# Patient Record
Sex: MALE | Race: OTHER | HISPANIC OR LATINO | URBAN - METROPOLITAN AREA
[De-identification: names, ages, dates, MRNs, and addresses within clinical notes are randomized per-mention and may not be internally consistent; named-entity substitution may affect disease eponyms.]

---

## 2020-04-02 ENCOUNTER — EMERGENCY (EMERGENCY)
Facility: HOSPITAL | Age: 60
LOS: 1 days | Discharge: HOME | End: 2020-04-02
Payer: MEDICAID

## 2020-04-02 VITALS
SYSTOLIC BLOOD PRESSURE: 156 MMHG | OXYGEN SATURATION: 100 % | WEIGHT: 154.32 LBS | TEMPERATURE: 99 F | DIASTOLIC BLOOD PRESSURE: 85 MMHG | RESPIRATION RATE: 20 BRPM | HEART RATE: 96 BPM | HEIGHT: 66.54 IN

## 2020-04-02 PROBLEM — Z00.00 ENCOUNTER FOR PREVENTIVE HEALTH EXAMINATION: Status: ACTIVE | Noted: 2020-04-02

## 2020-04-02 PROCEDURE — 99282 EMERGENCY DEPT VISIT SF MDM: CPT

## 2020-04-02 NOTE — ED PROVIDER NOTE - NSFOLLOWUPINSTRUCTIONS_ED_ALL_ED_FT
Hoy puede o no swetha sido examinado para detectar el virus COVID-19. Tendrá que aislarse kody los próximos 12 días y luego puede salir del aislamiento siempre y cuando tenga 3 días sin fiebre (sin goldie ningún medicamento para la fiebre).    Para el dolor o la fiebre, puede goldie: Tylenol 1000 mg por vía oral cada 6 horas, según sea necesario. (Jose Cruz 4000 mg en 24 horas).    Mantente tremayne hidratado bebiendo mucha agua todos los días.    ** Regrese a la janice de urgencias si comienza a tener dificultad para respirar, si silvia síntomas empeoran o si le preocupa. De lo contrario, quédese en casa y aísle.    ** Si tiene amigos o familiares que tienen síntomas leves que pueden deberse al virus, dígales que se queden en casa    Quédese en casa: las personas que están levemente enfermas con COVID-19 pueden recuperarse en casa. No se vaya, excepto para obtener atención médica. No visitar áreas públicas.  Manténgase en contacto con hendrickson médico. Llame antes de recibir atención médica. Asegúrese de recibir atención si se siente peor o si michelle que es karina emergencia.  Evite el transporte público: evite usar el transporte público, el transporte compartido o los taxis.  Manténgase alejado de los demás: tanto hung sea posible, debe permanecer en karina "habitación para enfermos" específica y lejos de otras personas en hendrickson hogar. Use un baño separado, si está disponible.  Limite el contacto con mascotas y animales: debe restringir el contacto con mascotas y otros animales, edda hung lo haría con otras personas.  Aunque no strange habido informes de mascotas u otros animales que se enfermen con COVID-19, aún se recomienda que las personas con el virus limiten el contacto con los animales hasta que se conozca más información.  Si está enfermo: debe usar karina mascarilla cuando esté cerca de otras personas y antes de ingresar al consultorio de un proveedor de atención médica.  Si está cuidando a otros: si la persona enferma no puede usar karina máscara facial (por ejemplo, porque causa problemas para respirar), las personas que viven en el hogar deben permanecer en karina habitación diferente. Cuando los cuidadores ingresan a la habitación de la persona enferma, deben usar karina máscara facial. No se recomiendan visitantes, aparte de los cuidadores.  Cubierta: Cubra hendrickson boca y nariz con un pañuelo cuando tosa o estornude.  Eliminar: tirar los pañuelos usados ??en un bote de basura forrado.  Lávese las otilia: Lávese las otilia inmediatamente con agua y jabón kody al menos 20 segundos. Si no hay agua y jabón disponibles, lávese las otilia con un desinfectante para otilia a base de alcohol que contenga al menos 60% de alcohol.  Desinfectante para otilia: si no hay agua y jabón disponibles, use un desinfectante para otilia a base de alcohol con al menos 60% de alcohol, cubriendo todas las superficies de silvia oitlia y frotándolas hasta que se sientan secas.  Jabón y agua: el jabón y el agua son la mejor opción, especialmente si las otilia están visiblemente sucias.  Evite tocar: evite tocarse los ojos, la nariz y la boca con las otilia sin jaylene.  No comparta: No comparta platos, vasos, vasos, utensilios para comer, toallas o ropa de cama con otras personas en hendrickson hogar.  Lávese tremayne después del uso: después de usar estos artículos, lávelos tremayne con agua y jabón o póngalos en el lavavajillas.  Limpie y desinfecte: Rutinariamente limpie las superficies de alto contacto en hendrickson "habitación para enfermos" y baño. Deje que otra persona limpie y desinfecte las superficies en las áreas comunes, lucia no en hendrickson habitación y baño.  Si un cuidador u otra persona necesita limpiar y desinfectar la habitación o el baño de karina persona enferma, debe hacerlo según sea necesario. El cuidador / otra persona debe usar karina máscara y esperar el mayor tiempo posible después de que la persona enferma haya usado el baño.  Las superficies de alto contacto incluyen teléfonos, controles remotos, mostradores, mesas, pomos de marisel, accesorios de baño, inodoros, teclados, tabletas y mesitas de noche.  Limpie y desinfecte las áreas que pueden tener yamilet, heces o fluidos corporales.  Busque atención médica, lucia llame leo: busque atención médica de inmediato si hendrickson enfermedad está empeorando (por ejemplo, si tiene dificultad para respirar).  Llame a hendrickson médico antes de ingresar: Antes de ir al consultorio del médico o la janice de emergencias, llame con anticipación y dígales silvia síntomas. Te dirán qué hacer.  Use karina máscara facial: si es posible, póngase karina máscara facial antes de ingresar al edificio. Si no puede ponerse karina máscara facial, trate de mantener karina distancia savage de otras personas (al menos a 6 pies de distancia). [place] ayudará a proteger a las personas en la oficina o en la janice de espera.  Siga las instrucciones de cuidado de hendrickson proveedor de atención médica y el departamento de linn local: las autoridades de linn locales le darán instrucciones sobre cómo controlar silvia síntomas y reportar información.  Llame al 911 si tiene karina emergencia médica: si tiene karina emergencia médica y necesita llamar al 911, notifique al operador que tiene o michelle que podría tener COVID-19. Si es posible, póngase karina mascarilla antes de que llegue la ayuda médica.

## 2020-04-02 NOTE — ED PROVIDER NOTE - CLINICAL SUMMARY MEDICAL DECISION MAKING FREE TEXT BOX
Well-appearing, vitals stable. O2 sat WNL. No signs of distress. No HAILE. Will discharge with isolation precautions and strict return instructions. Questions answered.

## 2020-04-02 NOTE — ED PROVIDER NOTE - OBJECTIVE STATEMENT
59 year-old male, no significant PMHx, presents with cc flu-like symptoms for 2-3 days. C/o subjective fever, chills, weakness, and cough. Reports significant improvement when takes Tylenol. 2 other people in household tested positive for COVID-19. Denies any chest pain, SOB, abdominal pain, urinary symptoms, rash, photophobia, neck stiffness or any other complaints.

## 2020-04-02 NOTE — ED PROVIDER NOTE - PATIENT PORTAL LINK FT
You can access the FollowMyHealth Patient Portal offered by [facility] by registering at the following website: [email or web address]. By joining [employer] FollowMyHealth portal, you will also be able to view your health information using other applications (apps) compatible with our system.

## 2020-04-02 NOTE — ED ADULT NURSE NOTE - CAS ELECT INFOMATION PROVIDED
seen, treated, and released by MANFRED Torrez in intake. No nursing intervention required./DC instructions

## 2020-04-03 ENCOUNTER — APPOINTMENT (OUTPATIENT)
Dept: DISASTER EMERGENCY | Facility: CLINIC | Age: 60
End: 2020-04-03

## 2022-01-11 NOTE — ED ADULT TRIAGE NOTE - PATIENT ON (OXYGEN DELIVERY METHOD)
[address] [facility], [address]  [place] [address]  Phone: [phone]          Return to Work/School    Patient: Freeman Montes  YOB: 1983   Date: 01/11/2022     To Whom It May Concern:     Freeman Montes was in contact with/seen in my office on 01/11/2022. COVID-19 is present in our communities across the state. There is limited testing for COVID at this time, so not all patients can be tested. In this situation, your employee meets the following criteria:     Freeman Montes has met the criteria for COVID-19 testing and has a POSITIVE result. He can return to work once they are asymptomatic for 24 hours without the use of fever reducing medications AND at least five days from the start of symptoms (or from the first positive result if they have no symptoms).      If you have any questions or concerns, or if I can be of further assistance, please do not hesitate to contact me.     Sincerely,    Dianna Parada PA-C        room air

## 2022-06-27 ENCOUNTER — EMERGENCY (EMERGENCY)
Facility: HOSPITAL | Age: 62
LOS: 0 days | Discharge: HOME | End: 2022-06-27
Attending: EMERGENCY MEDICINE

## 2022-06-27 VITALS
WEIGHT: 160.06 LBS | TEMPERATURE: 98 F | HEIGHT: 66.54 IN | SYSTOLIC BLOOD PRESSURE: 158 MMHG | HEART RATE: 85 BPM | DIASTOLIC BLOOD PRESSURE: 96 MMHG | RESPIRATION RATE: 18 BRPM | OXYGEN SATURATION: 97 %

## 2022-06-27 VITALS
DIASTOLIC BLOOD PRESSURE: 84 MMHG | TEMPERATURE: 98 F | SYSTOLIC BLOOD PRESSURE: 147 MMHG | OXYGEN SATURATION: 98 % | RESPIRATION RATE: 18 BRPM | HEART RATE: 80 BPM

## 2022-06-27 DIAGNOSIS — K29.70 GASTRITIS, UNSPECIFIED, WITHOUT BLEEDING: ICD-10-CM

## 2022-06-27 DIAGNOSIS — R10.13 EPIGASTRIC PAIN: ICD-10-CM

## 2022-06-27 DIAGNOSIS — I10 ESSENTIAL (PRIMARY) HYPERTENSION: ICD-10-CM

## 2022-06-27 DIAGNOSIS — M19.90 UNSPECIFIED OSTEOARTHRITIS, UNSPECIFIED SITE: ICD-10-CM

## 2022-06-27 LAB
ALBUMIN SERPL ELPH-MCNC: 3.8 G/DL — SIGNIFICANT CHANGE UP (ref 3.3–5)
ALP SERPL-CCNC: 73 U/L — SIGNIFICANT CHANGE UP (ref 40–120)
ALT FLD-CCNC: 28 U/L — SIGNIFICANT CHANGE UP (ref 12–78)
ANION GAP SERPL CALC-SCNC: 2 MMOL/L — LOW (ref 5–17)
AST SERPL-CCNC: 13 U/L — LOW (ref 15–37)
BASOPHILS # BLD AUTO: 0.03 K/UL — SIGNIFICANT CHANGE UP (ref 0–0.2)
BASOPHILS NFR BLD AUTO: 0.4 % — SIGNIFICANT CHANGE UP (ref 0–2)
BILIRUB SERPL-MCNC: 0.4 MG/DL — SIGNIFICANT CHANGE UP (ref 0.2–1.2)
BUN SERPL-MCNC: 24 MG/DL — HIGH (ref 7–23)
CALCIUM SERPL-MCNC: 9.2 MG/DL — SIGNIFICANT CHANGE UP (ref 8.5–10.1)
CHLORIDE SERPL-SCNC: 108 MMOL/L — SIGNIFICANT CHANGE UP (ref 96–108)
CO2 SERPL-SCNC: 30 MMOL/L — SIGNIFICANT CHANGE UP (ref 22–31)
CREAT SERPL-MCNC: 0.91 MG/DL — SIGNIFICANT CHANGE UP (ref 0.5–1.3)
EGFR: 97 ML/MIN/1.73M2 — SIGNIFICANT CHANGE UP
EOSINOPHIL # BLD AUTO: 0.04 K/UL — SIGNIFICANT CHANGE UP (ref 0–0.5)
EOSINOPHIL NFR BLD AUTO: 0.6 % — SIGNIFICANT CHANGE UP (ref 0–6)
GLUCOSE SERPL-MCNC: 94 MG/DL — SIGNIFICANT CHANGE UP (ref 70–99)
HCT VFR BLD CALC: 49.1 % — SIGNIFICANT CHANGE UP (ref 39–50)
HGB BLD-MCNC: 16 G/DL — SIGNIFICANT CHANGE UP (ref 13–17)
IMM GRANULOCYTES NFR BLD AUTO: 0.4 % — SIGNIFICANT CHANGE UP (ref 0–1.5)
LIDOCAIN IGE QN: 78 U/L — SIGNIFICANT CHANGE UP (ref 73–393)
LYMPHOCYTES # BLD AUTO: 1.4 K/UL — SIGNIFICANT CHANGE UP (ref 1–3.3)
LYMPHOCYTES # BLD AUTO: 20.1 % — SIGNIFICANT CHANGE UP (ref 13–44)
MCHC RBC-ENTMCNC: 29.1 PG — SIGNIFICANT CHANGE UP (ref 27–34)
MCHC RBC-ENTMCNC: 32.6 G/DL — SIGNIFICANT CHANGE UP (ref 32–36)
MCV RBC AUTO: 89.3 FL — SIGNIFICANT CHANGE UP (ref 80–100)
MONOCYTES # BLD AUTO: 0.69 K/UL — SIGNIFICANT CHANGE UP (ref 0–0.9)
MONOCYTES NFR BLD AUTO: 9.9 % — SIGNIFICANT CHANGE UP (ref 2–14)
NEUTROPHILS # BLD AUTO: 4.78 K/UL — SIGNIFICANT CHANGE UP (ref 1.8–7.4)
NEUTROPHILS NFR BLD AUTO: 68.6 % — SIGNIFICANT CHANGE UP (ref 43–77)
NRBC # BLD: 0 /100 WBCS — SIGNIFICANT CHANGE UP (ref 0–0)
PLATELET # BLD AUTO: 223 K/UL — SIGNIFICANT CHANGE UP (ref 150–400)
POTASSIUM SERPL-MCNC: 4.5 MMOL/L — SIGNIFICANT CHANGE UP (ref 3.5–5.3)
POTASSIUM SERPL-SCNC: 4.5 MMOL/L — SIGNIFICANT CHANGE UP (ref 3.5–5.3)
PROT SERPL-MCNC: 7.7 GM/DL — SIGNIFICANT CHANGE UP (ref 6–8.3)
RBC # BLD: 5.5 M/UL — SIGNIFICANT CHANGE UP (ref 4.2–5.8)
RBC # FLD: 13.9 % — SIGNIFICANT CHANGE UP (ref 10.3–14.5)
SODIUM SERPL-SCNC: 140 MMOL/L — SIGNIFICANT CHANGE UP (ref 135–145)
TROPONIN I, HIGH SENSITIVITY RESULT: <3 NG/L — SIGNIFICANT CHANGE UP
WBC # BLD: 6.97 K/UL — SIGNIFICANT CHANGE UP (ref 3.8–10.5)
WBC # FLD AUTO: 6.97 K/UL — SIGNIFICANT CHANGE UP (ref 3.8–10.5)

## 2022-06-27 PROCEDURE — 93010 ELECTROCARDIOGRAM REPORT: CPT

## 2022-06-27 PROCEDURE — 99285 EMERGENCY DEPT VISIT HI MDM: CPT

## 2022-06-27 RX ORDER — SODIUM CHLORIDE 9 MG/ML
1000 INJECTION INTRAMUSCULAR; INTRAVENOUS; SUBCUTANEOUS ONCE
Refills: 0 | Status: COMPLETED | OUTPATIENT
Start: 2022-06-27 | End: 2022-06-27

## 2022-06-27 RX ORDER — ONDANSETRON 8 MG/1
1 TABLET, FILM COATED ORAL
Qty: 6 | Refills: 0
Start: 2022-06-27 | End: 2022-06-29

## 2022-06-27 RX ORDER — FAMOTIDINE 10 MG/ML
20 INJECTION INTRAVENOUS ONCE
Refills: 0 | Status: COMPLETED | OUTPATIENT
Start: 2022-06-27 | End: 2022-06-27

## 2022-06-27 RX ORDER — LIDOCAINE 4 G/100G
10 CREAM TOPICAL ONCE
Refills: 0 | Status: COMPLETED | OUTPATIENT
Start: 2022-06-27 | End: 2022-06-27

## 2022-06-27 RX ORDER — FAMOTIDINE 10 MG/ML
1 INJECTION INTRAVENOUS
Qty: 14 | Refills: 0
Start: 2022-06-27 | End: 2022-07-03

## 2022-06-27 RX ADMIN — SODIUM CHLORIDE 1000 MILLILITER(S): 9 INJECTION INTRAMUSCULAR; INTRAVENOUS; SUBCUTANEOUS at 22:48

## 2022-06-27 RX ADMIN — SODIUM CHLORIDE 1000 MILLILITER(S): 9 INJECTION INTRAMUSCULAR; INTRAVENOUS; SUBCUTANEOUS at 20:28

## 2022-06-27 RX ADMIN — LIDOCAINE 10 MILLILITER(S): 4 CREAM TOPICAL at 20:28

## 2022-06-27 RX ADMIN — FAMOTIDINE 20 MILLIGRAM(S): 10 INJECTION INTRAVENOUS at 20:28

## 2022-06-27 RX ADMIN — Medication 15 MILLILITER(S): at 20:45

## 2022-06-27 NOTE — ED PROVIDER NOTE - OBJECTIVE STATEMENT
61 year old male history of arthritis, htn presents for abd pain.  Patient states that he has been having intermittent abd pain epigastric radiating to periumbilical region for the last 2 weeks.  Patient states he took pepto-bismol and sodium bicarbonate today for pain at 1500 but it did not provide relief.  States that he got checked out by EMS who suggested he come to the hospital for further evaluation. Patient denies any headache, dizziness, lightheadedness, fainting, chest pain, palpitations, shortness of breath, vomiting, diarrhea, constipation, fevers, chills, urinary symptoms.

## 2022-06-27 NOTE — ED PROVIDER NOTE - PHYSICAL EXAMINATION
GEN:   comfortable, in no apparent distress, AOx3  EYES:   PERRL, extra-occular movements intact  HEENT:   airway patent, moist mucosal membranes, uvula midline  CV:  RRR, Pulses- Radial: 2+ bilateral and equal  RESP:   clear to auscultation bilaterally, non-labored, speaking in full sentences  ABD:   soft, no guarding.  epigastric and periumbilical TTP  :   no cva tenderness  MSK:   no musculoskeletal tenderness, 5/5 strength, moving all extremities  SKIN:   dry, intact, no rash  NEURO:   AOx3, no focal weakness or loss of sensation, gait normal, GCS 15  PSYCH: calm, cooperative, no apparent risk to self and others

## 2022-06-27 NOTE — ED ADULT TRIAGE NOTE - DATE OF LAST VACCINATION
Patient called and states she has developed a yeast infection from antibiotics. She would like diflucan called in. She uses [facility].     27-Jun-2022

## 2022-06-27 NOTE — ED PROVIDER NOTE - NS ED ATTENDING STATEMENT MOD
This was a shared visit with the TERESA. I reviewed and verified the documentation and independently performed the documented:

## 2022-06-27 NOTE — ED PROVIDER NOTE - CARE PROVIDER_API CALL
Diony Adhikari)  Internal Medicine  [address] [facility], [address] [place] [address]  Phone: [phone]  Fax: [phone]  Follow Up Time: 1-3 Days

## 2022-06-27 NOTE — ED PROVIDER NOTE - PATIENT PORTAL LINK FT
You can access the FollowMyHealth Patient Portal offered by [facility] by registering at the following website: [email or web address]. By joining [employer]’s FollowMyHealth portal, you will also be able to view your health information using other applications (apps) compatible with our system.

## 2022-06-27 NOTE — ED ADULT NURSE NOTE - OBJECTIVE STATEMENT
received pt in bed 34D,  in use (Sandra, [id number]), 60yo M Aox4, med hx of arthritis, HTN on rx: meloxicam, nkda, presents to ED as per ems, pt c/o abdominal pain x 1 week. pt took Pepto-Bismol with no relief. Pt denies any chest pain, sob, n/v/d, recent travels, exposure to sick contacts.

## 2022-06-27 NOTE — ED PROVIDER NOTE - NSFOLLOWUPINSTRUCTIONS_ED_ALL_ED_FT
Gastritis - Adultos    Gastritis, Adult       La gastritis es la inflamación del estómago. Hay dos tipos de gastritis:  •Gastritis aguda. Heidy tipo aparece de manera repentina.      •Gastritis crónica. Heidy tipo es mucho más frecuente y dura mucho tiempo.      La gastritis se manifiesta cuando el revestimiento del estómago se debilita o se lesiona. Sin tratamiento, la gastritis puede causar sangrado y úlceras estomacales.      ¿Cuáles son las causas?    Esta afección puede ser causada por lo siguiente:  •Infección.      •Beber alcohol en exceso.      •Ciertos medicamentos. Estos incluyen corticoesteroides, antibióticos y algunos medicamentos de venta sin receta, hung aspirina o ibuprofeno.      •Hay demasiado ácido en el estómago.      •Karina enfermedad del intestino o del estómago.      •Estrés.      •Karina reacción alérgica.      •Enfermedad de Crohn.      •Algunos tratamientos para el cáncer (radiación).      A veces, se desconoce la causa de esta afección.      ¿Cuáles son los signos o los síntomas?    Los síntomas de esta afección incluyen los siguientes:  •Dolor o sensación de ardor en la parte superior del abdomen.      •Náuseas.      •Vómitos.      •Sensación molesta de saciedad después de comer.      •Pérdida de peso.      •Mal aliento.      •Yamilet en el vómito o las heces.      En algunos casos, no hay síntomas.      ¿Cómo se diagnostica?    Esta afección puede diagnosticarse en función de lo siguiente:  •Haley antecedentes médicos y karina descripción de haley síntomas.      •Un examen físico.    •Estudios. Estos pueden incluir los siguientes:  •Análisis de yamilet.      •Pruebas de heces.      •Un estudio en el que se introduce un instrumento gilman y flexible con karina gail y karina pequeña cámara a través del esófago hasta el estómago (endoscopía yaneli).      •Un estudio en el cual se jyoti karina muestra de tejido para analizarlo (biopsia).          ¿Cómo se trata?    Esta afección se puede tratar con medicamentos. Los medicamentos que se utilizan varían según la causa de la gastritis:  •Si la afección se debe a karina infección bacteriana, pueden recetarle medicamentos antibióticos.      •Si la afección se debe al exceso de ácido estomacal, se pueden administrar medicamentos denominados bloqueadores H2, inhibidores de la bomba de protones o antiácidos.      El tratamiento puede también incluir interrumpir el uso de ciertos medicamentos hung aspirina, ibuprofeno u otros antiinflamatorios no esteroideos (LOULOU).      Siga estas indicaciones en hendrickson casa:    Medicamentos     •[place] los medicamentos de venta mary y los recetados solamente hung se lo haya indicado el médico.      •Si le recetaron un antibiótico, tómelo hung se lo haya indicado el médico. No deje de goldie el antibiótico, aunque comience a sentirse mejor.        Comida y bebida      •Allie comidas pequeñas y frecuentes kody el día en lugar de comidas abundantes.      •Evite los alimentos y las bebidas que intensifican los síntomas.      •Betsy suficiente líquido hung para mantener la orina de color amarillo pálido.      Consumo de alcohol   • No betsy alcohol si:  •Hendrickson médico le indica no hacerlo.      •Está embarazada, puede estar embarazada o está tratando de quedar embarazada.      •Si parminder alcohol:•Limite hendrickson uso a las siguientes medidas:  •De 0 a 1 medida por día para las mujeres.      •De 0 a 2 medidas por día para los hombres.        •Esté atento a la cantidad de alcohol que hay en las bebidas que jyoti. En los [place], karina medida equivale a karina botella de cerveza de 12 oz (355 ml), un vaso de vino de 5 oz (148 ml) o un vaso de karina bebida alcohólica de yaneli graduación de 1½ oz (44 ml).        Indicaciones generales     •Hable con el médico sobre las formas de controlar el estrés, hung realizar ejercicio con regularidad o practicar respiración profunda, meditación o yoga.      • No consuma ningún producto que contenga nicotina o tabaco, hung cigarrillos y cigarrillos electrónicos. Si necesita ayuda para dejar de fumar, consulte al médico.      •Concurra a todas las visitas de seguimiento hung se lo haya indicado el médico. [place] es importante.        Comuníquese con un médico si:    •Haley síntomas empeoran.      •Los síntomas regresan después del tratamiento.        Solicite ayuda inmediatamente si:    •Vomita yamilet de color cast brillante o karina sustancia similar a los granos de café.      •Las heces son negras o de color cast oscuro.      •No puede retener los líquidos.      •El dolor abdominal empeora.      •Tiene fiebre.      •No se siente mejor luego de karina semana.        Resumen    •La gastritis es la inflamación del revestimiento del estómago que puede ocurrir de manera repentina (aguda) o desarrollarse lentamente con el tiempo (crónica).      •Esta afección se diagnostica mediante los antecedentes médicos, un examen físico o estudios.      •Esta afección puede tratarse con medicamentos para tratar la infección o medicamentos para reducir la cantidad de ácido en el estómago.      •Siga las indicaciones del médico acerca de goldie medicamentos, hacer cambios en la dieta y saber cuándo pedir ayuda.      Esta información no tiene hung fin reemplazar el consejo del médico. Asegúrese de hacerle al médico cualquier pregunta que tenga.

## 2022-06-27 NOTE — ED PROVIDER NOTE - ATTENDING APP SHARED VISIT CONTRIBUTION OF CARE
Patient evaluated and seen with ASHLEY Law agree with above history and physical - pt examined and seen by me personally - findings as seen: Pt with epigastric discomfort - otherwise mild tenderness on exam of epigastric region. No rebound, negative Adams's labs without acute findings, CT performed to r/o acute pathology - given GERD meds in ED. Patient evaluated and seen with ASHLEY Law agree with above history and physical - pt examined and seen by me personally - findings as seen: Pt with epigastric discomfort - otherwise mild tenderness on exam of epigastric region. No rebound, negative Adams's labs without acute findings,  given GERD meds in ED with improvement and repeat abd exams x 2 nontender now after meds - with labs wnl will DC with meds and GI follow up.

## 2022-06-27 NOTE — ED ADULT TRIAGE NOTE - CHIEF COMPLAINT QUOTE
as per ems, pt c/o abdominal pain x 1 week. pt took Pepto-Bismol with no relief. hx: arthritis, htn on rx: meloxicam

## 2022-06-27 NOTE — ED PROVIDER NOTE - CLINICAL SUMMARY MEDICAL DECISION MAKING FREE TEXT BOX
61 year old male history of arthritis, htn presents for abd pain.  Will check labs, EKG, CTAP to assess for intraabdominal pathology.  Will provide GI cocktail and IVF, reassess. 61 year old male history of arthritis, htn presents for abd pain.  Will check labs, EKG, CTAP to assess for intraabdominal pathology.  Will provide GI cocktail and IVF, reassess.    Pt pain improved and  completely resolved after gastritis meds, no longer with any abd tenderness on re-examination - will dc with follow up GI. Given pepcid/zofran for outpt use.

## 2023-03-07 ENCOUNTER — EMERGENCY (EMERGENCY)
Facility: HOSPITAL | Age: 63
LOS: 1 days | Discharge: HOME | End: 2023-03-07
Attending: STUDENT IN AN ORGANIZED HEALTH CARE EDUCATION/TRAINING PROGRAM
Payer: MEDICAID

## 2023-03-07 VITALS
HEART RATE: 88 BPM | SYSTOLIC BLOOD PRESSURE: 170 MMHG | WEIGHT: 174.17 LBS | OXYGEN SATURATION: 96 % | TEMPERATURE: 98 F | DIASTOLIC BLOOD PRESSURE: 75 MMHG | RESPIRATION RATE: 16 BRPM | HEIGHT: 68 IN

## 2023-03-07 VITALS
OXYGEN SATURATION: 99 % | TEMPERATURE: 98 F | DIASTOLIC BLOOD PRESSURE: 71 MMHG | HEART RATE: 61 BPM | SYSTOLIC BLOOD PRESSURE: 122 MMHG | RESPIRATION RATE: 18 BRPM

## 2023-03-07 PROBLEM — I10 ESSENTIAL (PRIMARY) HYPERTENSION: Chronic | Status: ACTIVE | Noted: 2022-06-27

## 2023-03-07 PROCEDURE — 99284 EMERGENCY DEPT VISIT MOD MDM: CPT

## 2023-03-07 PROCEDURE — 99283 EMERGENCY DEPT VISIT LOW MDM: CPT | Mod: 25

## 2023-03-07 PROCEDURE — 96372 THER/PROPH/DIAG INJ SC/IM: CPT

## 2023-03-07 RX ORDER — ACETAMINOPHEN 500 MG
975 TABLET ORAL ONCE
Refills: 0 | Status: COMPLETED | OUTPATIENT
Start: 2023-03-07 | End: 2023-03-07

## 2023-03-07 RX ORDER — CYCLOBENZAPRINE HYDROCHLORIDE 10 MG/1
5 TABLET, FILM COATED ORAL ONCE
Refills: 0 | Status: COMPLETED | OUTPATIENT
Start: 2023-03-07 | End: 2023-03-07

## 2023-03-07 RX ORDER — KETOROLAC TROMETHAMINE 30 MG/ML
15 SYRINGE (ML) INJECTION ONCE
Refills: 0 | Status: DISCONTINUED | OUTPATIENT
Start: 2023-03-07 | End: 2023-03-07

## 2023-03-07 RX ORDER — CYCLOBENZAPRINE HYDROCHLORIDE 10 MG/1
1 TABLET, FILM COATED ORAL
Qty: 15 | Refills: 0
Start: 2023-03-07 | End: 2023-03-11

## 2023-03-07 RX ADMIN — CYCLOBENZAPRINE HYDROCHLORIDE 5 MILLIGRAM(S): 10 TABLET, FILM COATED ORAL at 10:05

## 2023-03-07 RX ADMIN — Medication 15 MILLIGRAM(S): at 10:06

## 2023-03-07 RX ADMIN — Medication 975 MILLIGRAM(S): at 10:05

## 2023-03-07 NOTE — ED PROVIDER NOTE - PATIENT PORTAL LINK FT
You can access the FollowMyHealth Patient Portal offered by [facility] by registering at the following website: [email or web address]. By joining [employer]’s FollowMyHealth portal, you will also be able to view your health information using other applications (apps) compatible with our system.

## 2023-03-07 NOTE — ED PROVIDER NOTE - OBJECTIVE STATEMENT
62-year-old male no medical hx presenting with bilateral lower back pain x 5 days. No trauma. Denies LE numbness, tingling, weakness, saddle anesthesia, fever, chills, IVDU, hx of cancer, or any other concerning features. No other symptoms.

## 2023-03-07 NOTE — ED PROVIDER NOTE - NSFOLLOWUPINSTRUCTIONS_ED_ALL_ED_FT
You were seen in the emergency department for: back pain  For your pain, please take Tylenol 1000mg every 6 hours as needed or Ibuprofen 600mg every 6 hours as needed - you can alternate every 3 hours as needed.   We recommend you follow up with: your primary care doctor.    Please return to the Emergency Department if you experience any of the following symptoms:   - Shortness of breath or trouble breathing  - Pressure, pain or tightness in the chest  - Face drooping, arm weakness or speech difficulty  - Persistence of severe vomiting  - Head injury or loss of consciousness  - Nonstop bleeding or an open wound    (1) Follow up with your primary care physician within the next 24-48 hours as discussed. In addition, we did not find evidence of a life threatening illness on your testing here today, but listed below are the specialists that will be necessary to see as an outpatient to continue the workup.  Please call the numbers listed below or [phone] to set up the necessary appointments.  (2) Take Tylenol (up to 1000mg or 1 g)  and/or Motrin (up to 600mg) up to every 6 hours as needed for pain.   (3) If you had an IV (intravenous) line placed, it was removed. Sometimes, after IV removal, that area can be tender for a few days; if it develops redness and swelling, those could be signs of infection; in which case, return to the Emergency Department for assessment.  (4) Please continue taking all of your home medications as directed.

## 2023-03-07 NOTE — ED PROVIDER NOTE - CLINICAL SUMMARY MEDICAL DECISION MAKING FREE TEXT BOX
62-year-old male no medical hx presenting with bilateral lower back pain x 5 days. No signs or symptoms of cord compression or cauda equina. Likely MSK pain. Pain meds provided, will reassess.

## 2023-06-14 ENCOUNTER — NON-APPOINTMENT (OUTPATIENT)
Age: 63
End: 2023-06-14

## 2023-08-23 ENCOUNTER — NON-APPOINTMENT (OUTPATIENT)
Age: 63
End: 2023-08-23

## 2024-08-06 ENCOUNTER — NON-APPOINTMENT (OUTPATIENT)
Age: 64
End: 2024-08-06

## 2024-11-24 ENCOUNTER — EMERGENCY (EMERGENCY)
Facility: HOSPITAL | Age: 64
LOS: 1 days | Discharge: HOME | End: 2024-11-24
Attending: EMERGENCY MEDICINE
Payer: MEDICAID

## 2024-11-24 ENCOUNTER — NON-APPOINTMENT (OUTPATIENT)
Age: 64
End: 2024-11-24

## 2024-11-24 VITALS
SYSTOLIC BLOOD PRESSURE: 154 MMHG | RESPIRATION RATE: 18 BRPM | TEMPERATURE: 98 F | HEIGHT: 66.14 IN | HEART RATE: 90 BPM | OXYGEN SATURATION: 95 % | WEIGHT: 178.57 LBS | DIASTOLIC BLOOD PRESSURE: 92 MMHG

## 2024-11-24 VITALS
SYSTOLIC BLOOD PRESSURE: 137 MMHG | RESPIRATION RATE: 17 BRPM | HEART RATE: 84 BPM | OXYGEN SATURATION: 99 % | TEMPERATURE: 98 F | DIASTOLIC BLOOD PRESSURE: 84 MMHG

## 2024-11-24 LAB
ALBUMIN SERPL ELPH-MCNC: 3.8 G/DL — SIGNIFICANT CHANGE UP (ref 3.5–5)
ALP SERPL-CCNC: 85 U/L — SIGNIFICANT CHANGE UP (ref 40–120)
ALT FLD-CCNC: 28 U/L DA — SIGNIFICANT CHANGE UP (ref 10–60)
ANION GAP SERPL CALC-SCNC: 2 MMOL/L — LOW (ref 5–17)
APPEARANCE UR: CLEAR — SIGNIFICANT CHANGE UP
AST SERPL-CCNC: 13 U/L — SIGNIFICANT CHANGE UP (ref 10–40)
BACTERIA # UR AUTO: ABNORMAL /HPF
BASOPHILS # BLD AUTO: 0.03 K/UL — SIGNIFICANT CHANGE UP (ref 0–0.2)
BASOPHILS NFR BLD AUTO: 0.3 % — SIGNIFICANT CHANGE UP (ref 0–2)
BILIRUB SERPL-MCNC: 0.2 MG/DL — SIGNIFICANT CHANGE UP (ref 0.2–1.2)
BILIRUB UR-MCNC: NEGATIVE — SIGNIFICANT CHANGE UP
BUN SERPL-MCNC: 22 MG/DL — HIGH (ref 7–18)
CALCIUM SERPL-MCNC: 8.8 MG/DL — SIGNIFICANT CHANGE UP (ref 8.4–10.5)
CHLORIDE SERPL-SCNC: 111 MMOL/L — HIGH (ref 96–108)
CO2 SERPL-SCNC: 27 MMOL/L — SIGNIFICANT CHANGE UP (ref 22–31)
COLOR SPEC: YELLOW — SIGNIFICANT CHANGE UP
CREAT SERPL-MCNC: 1 MG/DL — SIGNIFICANT CHANGE UP (ref 0.5–1.3)
DIFF PNL FLD: NEGATIVE — SIGNIFICANT CHANGE UP
EGFR: 85 ML/MIN/1.73M2 — SIGNIFICANT CHANGE UP
EOSINOPHIL # BLD AUTO: 0.04 K/UL — SIGNIFICANT CHANGE UP (ref 0–0.5)
EOSINOPHIL NFR BLD AUTO: 0.4 % — SIGNIFICANT CHANGE UP (ref 0–6)
EPI CELLS # UR: PRESENT
GLUCOSE SERPL-MCNC: 109 MG/DL — HIGH (ref 70–99)
GLUCOSE UR QL: NEGATIVE MG/DL — SIGNIFICANT CHANGE UP
HCT VFR BLD CALC: 46.9 % — SIGNIFICANT CHANGE UP (ref 39–50)
HGB BLD-MCNC: 15.9 G/DL — SIGNIFICANT CHANGE UP (ref 13–17)
IMM GRANULOCYTES NFR BLD AUTO: 0.4 % — SIGNIFICANT CHANGE UP (ref 0–0.9)
KETONES UR-MCNC: NEGATIVE MG/DL — SIGNIFICANT CHANGE UP
LEUKOCYTE ESTERASE UR-ACNC: NEGATIVE — SIGNIFICANT CHANGE UP
LIDOCAIN IGE QN: 28 U/L — SIGNIFICANT CHANGE UP (ref 13–75)
LYMPHOCYTES # BLD AUTO: 1.1 K/UL — SIGNIFICANT CHANGE UP (ref 1–3.3)
LYMPHOCYTES # BLD AUTO: 11.7 % — LOW (ref 13–44)
MCHC RBC-ENTMCNC: 30 PG — SIGNIFICANT CHANGE UP (ref 27–34)
MCHC RBC-ENTMCNC: 33.9 G/DL — SIGNIFICANT CHANGE UP (ref 32–36)
MCV RBC AUTO: 88.5 FL — SIGNIFICANT CHANGE UP (ref 80–100)
MONOCYTES # BLD AUTO: 0.9 K/UL — SIGNIFICANT CHANGE UP (ref 0–0.9)
MONOCYTES NFR BLD AUTO: 9.6 % — SIGNIFICANT CHANGE UP (ref 2–14)
NEUTROPHILS # BLD AUTO: 7.3 K/UL — SIGNIFICANT CHANGE UP (ref 1.8–7.4)
NEUTROPHILS NFR BLD AUTO: 77.6 % — HIGH (ref 43–77)
NITRITE UR-MCNC: NEGATIVE — SIGNIFICANT CHANGE UP
NRBC # BLD: 0 /100 WBCS — SIGNIFICANT CHANGE UP (ref 0–0)
PH UR: 7.5 — SIGNIFICANT CHANGE UP (ref 5–8)
PLATELET # BLD AUTO: 212 K/UL — SIGNIFICANT CHANGE UP (ref 150–400)
POTASSIUM SERPL-MCNC: 4 MMOL/L — SIGNIFICANT CHANGE UP (ref 3.5–5.3)
POTASSIUM SERPL-SCNC: 4 MMOL/L — SIGNIFICANT CHANGE UP (ref 3.5–5.3)
PROT SERPL-MCNC: 7.7 G/DL — SIGNIFICANT CHANGE UP (ref 6–8.3)
PROT UR-MCNC: NEGATIVE MG/DL — SIGNIFICANT CHANGE UP
RBC # BLD: 5.3 M/UL — SIGNIFICANT CHANGE UP (ref 4.2–5.8)
RBC # FLD: 13.8 % — SIGNIFICANT CHANGE UP (ref 10.3–14.5)
RBC CASTS # UR COMP ASSIST: 1 /HPF — SIGNIFICANT CHANGE UP (ref 0–4)
SODIUM SERPL-SCNC: 140 MMOL/L — SIGNIFICANT CHANGE UP (ref 135–145)
SP GR SPEC: 1.01 — SIGNIFICANT CHANGE UP (ref 1–1.03)
UROBILINOGEN FLD QL: 0.2 MG/DL — SIGNIFICANT CHANGE UP (ref 0.2–1)
WBC # BLD: 9.41 K/UL — SIGNIFICANT CHANGE UP (ref 3.8–10.5)
WBC # FLD AUTO: 9.41 K/UL — SIGNIFICANT CHANGE UP (ref 3.8–10.5)
WBC UR QL: 1 /HPF — SIGNIFICANT CHANGE UP (ref 0–5)

## 2024-11-24 PROCEDURE — 99284 EMERGENCY DEPT VISIT MOD MDM: CPT

## 2024-11-24 PROCEDURE — 81001 URINALYSIS AUTO W/SCOPE: CPT

## 2024-11-24 PROCEDURE — 80053 COMPREHEN METABOLIC PANEL: CPT

## 2024-11-24 PROCEDURE — 99284 EMERGENCY DEPT VISIT MOD MDM: CPT | Mod: 25

## 2024-11-24 PROCEDURE — 83690 ASSAY OF LIPASE: CPT

## 2024-11-24 PROCEDURE — 96365 THER/PROPH/DIAG IV INF INIT: CPT

## 2024-11-24 PROCEDURE — 76705 ECHO EXAM OF ABDOMEN: CPT | Mod: 26

## 2024-11-24 PROCEDURE — 76705 ECHO EXAM OF ABDOMEN: CPT

## 2024-11-24 PROCEDURE — 74176 CT ABD & PELVIS W/O CONTRAST: CPT | Mod: 26,MC

## 2024-11-24 PROCEDURE — 87086 URINE CULTURE/COLONY COUNT: CPT

## 2024-11-24 PROCEDURE — 85025 COMPLETE CBC W/AUTO DIFF WBC: CPT

## 2024-11-24 PROCEDURE — 74176 CT ABD & PELVIS W/O CONTRAST: CPT | Mod: MC

## 2024-11-24 PROCEDURE — 36415 COLL VENOUS BLD VENIPUNCTURE: CPT

## 2024-11-24 RX ORDER — FAMOTIDINE 10 MG/ML
1 INJECTION INTRAVENOUS
Qty: 30 | Refills: 0
Start: 2024-11-24 | End: 2024-12-23

## 2024-11-24 RX ORDER — ACETAMINOPHEN 500 MG
1000 TABLET ORAL ONCE
Refills: 0 | Status: COMPLETED | OUTPATIENT
Start: 2024-11-24 | End: 2024-11-24

## 2024-11-24 RX ORDER — SODIUM CHLORIDE 9 MG/ML
3 INJECTION, SOLUTION INTRAMUSCULAR; INTRAVENOUS; SUBCUTANEOUS ONCE
Refills: 0 | Status: COMPLETED | OUTPATIENT
Start: 2024-11-24 | End: 2024-11-24

## 2024-11-24 RX ORDER — ACETAMINOPHEN 500 MG
2 TABLET ORAL
Qty: 80 | Refills: 0
Start: 2024-11-24 | End: 2024-12-03

## 2024-11-24 RX ORDER — IBUPROFEN 200 MG
1 TABLET ORAL
Qty: 28 | Refills: 0
Start: 2024-11-24 | End: 2024-11-30

## 2024-11-24 RX ADMIN — Medication 400 MILLIGRAM(S): at 20:09

## 2024-11-24 RX ADMIN — SODIUM CHLORIDE 3 MILLILITER(S): 9 INJECTION, SOLUTION INTRAMUSCULAR; INTRAVENOUS; SUBCUTANEOUS at 20:11

## 2024-11-24 RX ADMIN — Medication 1000 MILLIGRAM(S): at 20:25

## 2024-11-24 RX ADMIN — Medication 1000 MILLIGRAM(S): at 20:39

## 2024-11-24 NOTE — ED PROVIDER NOTE - CPE EDP ENMT NORM
Transitional Care Follow Up Visit  Subjective     Beryl Montoya is a 77 y.o. female who presents for a transitional care management visit.    Within 48 business hours after discharge our office contacted her via telephone to coordinate her care and needs.      I reviewed and discussed the details of that call along with the discharge summary, hospital problems, inpatient lab results, inpatient diagnostic studies, and consultation reports with Beryl.     Current outpatient and discharge medications have been reconciled for the patient.  Reviewed by: Argelia Gamez MD     Current outpatient and discharge medications have been reconciled for the patient.  Reviewed by: Argelia Gamez MD      Current Outpatient Medications:   •  acetaminophen (TYLENOL) 325 MG tablet, Take 2 tablets by mouth Every 6 (Six) Hours As Needed for Mild Pain ., Disp: , Rfl:   •  apixaban (ELIQUIS) 5 MG tablet tablet, 1 tablet by Nasogastric route Every 12 (Twelve) Hours. Indications: Atrial Fibrillation, Disp: 60 tablet, Rfl:   •  aspirin 81 MG chewable tablet, Chew 1 tablet Daily., Disp: , Rfl:   •  atorvastatin (LIPITOR) 80 MG tablet, Take 1 tablet by mouth Daily., Disp: 90 tablet, Rfl: 2  •  carvedilol (COREG) 12.5 MG tablet, Take 1 tablet by mouth 2 (Two) Times a Day., Disp: , Rfl:   •  furosemide (LASIX) 40 MG tablet, Take 1 tablet by mouth Daily., Disp: , Rfl:   •  insulin detemir (LEVEMIR) 100 UNIT/ML injection, Inject 20 Units under the skin into the appropriate area as directed Daily., Disp: , Rfl: 12  •  modafinil (PROVIGIL) 100 MG tablet, Take 1 tablet by mouth Daily With Breakfast., Disp: , Rfl:   •  pantoprazole (PROTONIX) 40 MG EC tablet, Take 40 mg by mouth Daily., Disp: , Rfl:   •  polyethylene glycol (MIRALAX) packet, Take 17 g by mouth Daily., Disp: , Rfl:   •  potassium chloride (K-DUR,KLOR-CON) 20 MEQ CR tablet, Take 20 mEq by mouth Daily., Disp: , Rfl:   •  sacubitril-valsartan (ENTRESTO)  "24-26 MG tablet, 1 tablet by Nasogastric route Every 12 (Twelve) Hours., Disp: 60 tablet, Rfl:   •  mirtazapine (REMERON) 7.5 MG tablet, Take 1 tablet by mouth Every Night., Disp: 30 tablet, Rfl: 2        Date of TCM Phone Call 7/28/2020   [facility]   Date of Admission -   Date of Discharge 7/24/2020   Risk for Readmission (LACE Score) -   Discharge Disposition -     Risk for Readmission (LACE) Score: 13 (7/20/2020  6:00 AM)      History of Present Illness   Course During Hospital Stay:  Per discharge summary \"The patient is a 77-year-old female who was admitted on 6/30/2020 and underwent coronary artery bypass grafting x3 with endoscopic vein harvest performed by Dr. Jerry Lozano.  The patient tolerated the procedure well without any immediate complications and was transferred to the intensive care unit stable condition.  On postoperative day #1, the patient was still intubated requiring ventilatory support.  The patient was also requiring mild pressor and inotropic support and was found to have acute blood loss anemia that was therefore transfused 1 unit of packed red blood cells.  Later on postoperative day #1, the patient was successfully extubated.  Patient was titrated off of the milrinone and pressor agents.  On postoperative day #2, patient was doing well however experienced a slow neurologic recovery with mild expressive aphasia and the inability to follow commands consistently.  The patient was evaluated by speech pathology who eventually initiated enteral nutrition via tube feeds.  On postoperative day #3, the patient had arterial line removed without difficulty.  On postoperative day #4, the patient had the chest tubes and temporary epicardial pacing wires removed without difficulty and eventually had the Baldwyn-Kristine catheter removed.  Due to the patient's postoperative altered mental status the patient underwent a CT of the brain with cerebral perfusion imaging which revealed an " area of ischemic insult in the left occipital lobe.  Neurology was consulted and managed the patient from a neurologic perspective.  Over the course of the next several days, the patient worked with physical therapy and the nursing staff to ambulate in the hallway and improve her functional status.  On 7/6/2020, the patient was found to be volume overloaded and was successfully diuresed.  The patient continued to progress however very slowly over the next week.  She continued to improve from her lower neurologic standpoint however this is very slow and that she continued to experience lengthy periods of altered mental status and inability to follow commands.  On 7/8/2020, the patient was ready for transfer to telemetry.  Patient continues required to be n.p.o. due to dysphagia and enteral nutrition via tube feeds.  Patient's overall motor function continue to progress however her aphasia was very slow to improve.  On 7/12/2020, patient experienced an episode of high accelerated hypertension with associated hypoxemia.  Patient was given labetalol ultimately came unresponsive with labored breathing and was subsequently transferred to the intensive care unit and placed on BiPAP.  Patient was diuresed from intensive care unit.  The patient was found to have a moderate right pleural effusion therefore a pigtail catheter was placed for drainage of the effusion.  The pleural effusion was successfully drained and the chest tube was eventually removed.  Patient remained in the intensive care unit for few days and was ready for transfer back to telemetry.  It was also felt the patient was experiencing some metabolic encephalopathy contributing to her agitation and confusion.  Neurology modified her medications and initiated Provigil to address this.  On 7/15/2020, the patient underwent a modified barium swallow which the patient passed and was started on soft texture, chopped thin liquid, diet as recommended per speech  "pathology.  Patient had the NG tube removed without difficulty.  Patient continue make progress working with PT/OT and eventually met criteria for transfer to rehab.  On 7/20/2020, the patient met criteria for transfer and transferred to rehab without difficulty.  The patient's diet should be continued as recommended per speech pathology and should follow-up with neurology with a repeat CT of the head within 4 to 6 weeks.\"    Interval history: Pt presents with her  today.  He says that every day she is doing better.  He says her short-term memory is still significantly impacted.  She is eating a soft diet, is being followed by speech.  She is also having PT and OT via home health.  She is walking independently though her balance is off.  She is speaking and following commands.   would like a prescription for a rolling walker so that she can be more independent in the home.  She is at high risk for falls given her balance issues.  Patient not conversant like she normally is.  Has been complains of anorexia.  Says that patient will eat 2 bites and then will need any more.  Patient denies abdominal pain, nausea, choking.  He has not noted any of this either.  She says \"I do not know\" when asked why she does not want to eat.  Last visit about a month ago she weighed 166, now at 147.   says that mood is okay.  Does not have any other needs at this time.  He has cut her modafinil in half.  Was being used for wakefulness but he does not really feel that she needs it anymore.  He does not think that it will be a chronic prescription.  They do not have any follow-up with neurology nor was that recommended.     The following portions of the patient's history were reviewed and updated as appropriate: allergies, past family history and past surgical history.    Review of Systems   Constitutional: Negative.    Respiratory: Negative.    Cardiovascular: Negative.    Gastrointestinal: Negative.    Endocrine: " Negative.    Genitourinary: Negative.    Musculoskeletal: Negative.    Skin: Negative.    Neurological:        See HPI   Psychiatric/Behavioral: Negative.        Objective   Physical Exam   Constitutional: She appears well-developed and well-nourished. She appears listless.   Pulmonary/Chest: Effort normal.   Wound c/d/i, healing nicely       Neurological: She appears listless.   Abnormal gait, very slow. Follows commands, short term memory impaired   Skin: Skin is warm and dry.   Psychiatric: She has a normal mood and affect. Her behavior is normal. Judgment and thought content normal.   Vitals reviewed.      Assessment/Plan   Beryl was seen today for hospital follow up.    Diagnoses and all orders for this visit:    Acute postop embolic left occipital CVA 7/4/20. Extensive in L PCA distribution but additional areas in R PCA distribution (CMS/[facility])  -making progress though short term still impaired, some listlessness as well. 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  Healing well and tolerating medications.    Anorexia  -     mirtazapine (REMERON) 7.5 MG tablet; Take 1 tablet by mouth Every Night.  -New med, discussed that this may make her even more listless, should that happen  is to discontinue medication and let me know.  May be a good option for some depressive type symptoms as well    Type 2 diabetes mellitus with complication, with long-term current use of insulin (CMS/[facility])  -Glucose stable, continue insulin                  normal...

## 2024-11-24 NOTE — ED ADULT NURSE NOTE - OBJECTIVE STATEMENT
Patient came in ED c/o RUQ abdominal pain since yesterday, reports also frequent and burning  urination. patient denies fever, nausea, vomiting.

## 2024-11-24 NOTE — ED PROVIDER NOTE - OBJECTIVE STATEMENT
Patient is a 64-year-old male  complaining of crampy intermittent right upper quadrant pain since yesterday no fever no chills states he has had a history of frequency and urgency on urination with pain radiating to the groin area on the right side.  No history of diabetes.  No nausea no vomiting no diarrhea.

## 2024-11-24 NOTE — ED PROVIDER NOTE - CLINICAL SUMMARY MEDICAL DECISION MAKING FREE TEXT BOX
will give acetaminophen for pain we will get a CBC CMP PT lipase will check the urine because of his frequency on urination and for evidence of hematuria and will do a CT of the abdomen and pelvis no contrast

## 2024-11-24 NOTE — ED PROVIDER NOTE - PATIENT PORTAL LINK FT
You can access the FollowMyHealth Patient Portal offered by [facility] by registering at the following website: [email or web address]. By joining [employer]’s FollowMyHealth portal, you will also be able to view your health information using other applications (apps) compatible with our system.

## 2024-11-24 NOTE — ED PROVIDER NOTE - NSFOLLOWUPINSTRUCTIONS_ED_ALL_ED_FT
Realice un seguimiento con hendrickson médico de atención primaria dentro de 1 semana.     Puede goldie Motrin (ibuprofeno) 600 mg cada 6 horas o Tylenol (acetaminofén) 1000 mg cada 6 horas según sea necesario para el dolor o la fiebre.     Si experimenta síntomas nuevos o que empeoran o si está preocupado, siempre puede volver a urgencias para karina reevaluación.    Follow up with your primary care doctor within 1 week.     You can take Motrin (ibuprofen) 600 mg every 6 hours or Tylenol (acetaminophen) 1000 mg every 6 hours as needed for pain or fever.     If you experience any new or worsening symptoms or if you are concerned you can always come back to the emergency for a re-evaluation.

## 2024-11-24 NOTE — ED PROVIDER NOTE - PROGRESS NOTE DETAILS
CT and ultrasound without finding for patient's pain.  Ultrasound shows mild diffuse increased echogenicity throughout the right renal parenchyma can sometimes be seen in chronic medical renal disease.  Patient with normal BUN and creatinine.  Will have patient follow-up with PCP. Pt is well appearing walking with steady gait, stable for discharge and follow up without fail with medical doctor. I had a detailed discussion with the patient and/or guardian regarding the historical points, exam findings, and any diagnostic results supporting the discharge diagnosis. Pt educated on care and need for follow up. Strict return instructions and red flag signs and symptoms discussed with patient. Questions answered. Pt shows understanding of discharge information and agrees to follow.

## 2024-11-26 LAB
CULTURE RESULTS: SIGNIFICANT CHANGE UP
SPECIMEN SOURCE: SIGNIFICANT CHANGE UP

## 2024-12-02 NOTE — ED POST DISCHARGE NOTE - DETAILS
used to review renal cysts with patient, he was already aware, has follow up -Gilbert Adhikari PA-C